# Patient Record
Sex: FEMALE | Race: OTHER | HISPANIC OR LATINO | Employment: PART TIME | URBAN - METROPOLITAN AREA
[De-identification: names, ages, dates, MRNs, and addresses within clinical notes are randomized per-mention and may not be internally consistent; named-entity substitution may affect disease eponyms.]

---

## 2023-04-18 ENCOUNTER — HOSPITAL ENCOUNTER (EMERGENCY)
Facility: HOSPITAL | Age: 33
Discharge: HOME | End: 2023-04-18
Attending: EMERGENCY MEDICINE

## 2023-04-18 ENCOUNTER — TELEPHONE (OUTPATIENT)
Dept: OBGYN CLINIC | Facility: OTHER | Age: 33
End: 2023-04-18

## 2023-04-18 ENCOUNTER — APPOINTMENT (EMERGENCY)
Dept: RADIOLOGY | Facility: HOSPITAL | Age: 33
End: 2023-04-18

## 2023-04-18 VITALS
RESPIRATION RATE: 18 BRPM | OXYGEN SATURATION: 100 % | WEIGHT: 133.6 LBS | SYSTOLIC BLOOD PRESSURE: 116 MMHG | HEART RATE: 79 BPM | TEMPERATURE: 98.2 F | DIASTOLIC BLOOD PRESSURE: 59 MMHG

## 2023-04-18 DIAGNOSIS — M25.532 LEFT WRIST PAIN: Primary | ICD-10-CM

## 2023-04-18 DIAGNOSIS — M67.432 GANGLION CYST OF DORSUM OF LEFT WRIST: ICD-10-CM

## 2023-04-18 LAB
EXT PREGNANCY TEST URINE: NEGATIVE
EXT. CONTROL: NORMAL

## 2023-04-18 RX ORDER — KETOROLAC TROMETHAMINE 30 MG/ML
15 INJECTION, SOLUTION INTRAMUSCULAR; INTRAVENOUS ONCE
Status: COMPLETED | OUTPATIENT
Start: 2023-04-18 | End: 2023-04-18

## 2023-04-18 RX ADMIN — KETOROLAC TROMETHAMINE 15 MG: 30 INJECTION, SOLUTION INTRAMUSCULAR; INTRAVENOUS at 11:27

## 2023-04-18 NOTE — TELEPHONE ENCOUNTER
Patient is being referred to a orthopedics  Please schedule accordingly      [address]   [phone]

## 2023-04-18 NOTE — Clinical Note
Malia Sangeetha was seen and treated in our emergency department on 4/18/2023  Diagnosis:     Arpitavidal Garcia    She may return on this date: 04/19/2023         If you have any questions or concerns, please don't hesitate to call        Tiffani Lockett PA-C    ______________________________           _______________          _______________  Hospital Representative                              Date                                Time

## 2023-04-18 NOTE — ED PROVIDER NOTES
History  Chief Complaint   Patient presents with   • Wrist Pain     Pt c/o left wrist pain for the past x 2-3 weeks  Pt denies any known injury  Pt denies any other medical concerns     Andrew Littlejohn is a 35 y o  female with no significant past medical history presenting to the ER complaining of worsening left wrist pain over the past 3 months  Patient reports that 3 weeks ago she noticed a bump at the dorsal aspect of her wrist   Patient reports that pain is worse with movement and especially when she is cutting vegetables     She denies any injury  Patient has not attempted any treatment at home  None       History reviewed  No pertinent past medical history  Past Surgical History:   Procedure Laterality Date   •  SECTION         History reviewed  No pertinent family history  I have reviewed and agree with the history as documented  E-Cigarette/Vaping   • E-Cigarette Use Never User      E-Cigarette/Vaping Substances     Social History     Tobacco Use   • Smoking status: Never   • Smokeless tobacco: Never   Vaping Use   • Vaping Use: Never used   Substance Use Topics   • Alcohol use: Never   • Drug use: Never       Review of Systems   Constitutional: Negative for chills and fever  HENT: Negative for ear pain and sore throat  Eyes: Negative for pain and visual disturbance  Respiratory: Negative for cough and shortness of breath  Cardiovascular: Negative for chest pain and palpitations  Gastrointestinal: Negative for abdominal pain and vomiting  Genitourinary: Negative for dysuria and hematuria  Musculoskeletal: Negative for arthralgias and back pain  Left wrist pain   Skin: Negative for color change and rash  Neurological: Negative for seizures and syncope  All other systems reviewed and are negative  Physical Exam  Physical Exam  Vitals and nursing note reviewed  Constitutional:       General: She is not in acute distress       Appearance: She is well-developed  She is not ill-appearing, toxic-appearing or diaphoretic  HENT:      Head: Normocephalic and atraumatic  Eyes:      Conjunctiva/sclera: Conjunctivae normal    Cardiovascular:      Rate and Rhythm: Normal rate and regular rhythm  Heart sounds: No murmur heard  Pulmonary:      Effort: Pulmonary effort is normal  No respiratory distress  Breath sounds: Normal breath sounds  No stridor  No wheezing, rhonchi or rales  Abdominal:      Palpations: Abdomen is soft  Tenderness: There is no abdominal tenderness  Musculoskeletal:         General: No swelling  Right wrist: Normal  No swelling, bony tenderness or snuff box tenderness  Normal range of motion  Normal pulse  Left wrist: No swelling, bony tenderness or snuff box tenderness  Normal range of motion  Normal pulse  Cervical back: Neck supple  Comments: Ganglion cyst at the dorsal aspect of left wrist  Image attached to chart  There is tenderness to palpation over ganglion cyst   No other bony tenderness at left wrist or overlying skin changes  Full range of motion  Reported pain when ranging L wrist     Skin:     General: Skin is warm and dry  Capillary Refill: Capillary refill takes less than 2 seconds  Neurological:      Mental Status: She is alert     Psychiatric:         Mood and Affect: Mood normal              Vital Signs  ED Triage Vitals [04/18/23 1019]   Temperature Pulse Respirations Blood Pressure SpO2   98 2 °F (36 8 °C) 79 18 116/59 100 %      Temp Source Heart Rate Source Patient Position - Orthostatic VS BP Location FiO2 (%)   Oral Monitor Sitting Right arm --      Pain Score       8           Vitals:    04/18/23 1019   BP: 116/59   Pulse: 79   Patient Position - Orthostatic VS: Sitting         Visual Acuity      ED Medications  Medications   ketorolac (TORADOL) injection 15 mg (15 mg Intramuscular Given 4/18/23 1127)       Diagnostic Studies  Results Reviewed     Procedure Component Value Units Date/Time    POCT pregnancy, urine [[id number]]  (Normal) Resulted: 04/18/23 1126    Lab Status: Final result Updated: 04/18/23 1126     EXT Preg Test, Ur Negative     Control Valid                 XR wrist 3+ views LEFT   ED Interpretation by Lobito Kuo PA-C (04/18 1233)   No obvious acute osseous abnormality      Final Result by Daniella Mcmanus MD (04/18 1435)      No acute osseous abnormality  Workstation performed: [id number]                    Procedures  Procedures         ED Course                               SBIRT 20yo+    Flowsheet Row Most Recent Value   Initial Alcohol Screen: US AUDIT-C     1  How often do you have a drink containing alcohol? 0 Filed at: 04/18/2023 1033   2  How many drinks containing alcohol do you have on a typical day you are drinking? 0 Filed at: 04/18/2023 1033   3a  Male UNDER 65: How often do you have five or more drinks on one occasion? 0 Filed at: 04/18/2023 1033   3b  FEMALE Any Age, or MALE 65+: How often do you have 4 or more drinks on one occassion? 0 Filed at: 04/18/2023 1033   Audit-C Score 0 Filed at: 04/18/2023 1033   GARRETT: How many times in the past year have you    Used an illegal drug or used a prescription medication for non-medical reasons? Never Filed at: 04/18/2023 1033                    Medical Decision Making  Tiff Hawkins is a 35 y o  female with no significant past medical history presenting to the ER complaining of worsening left wrist pain over the past 3 months  Denies any injury  On exam patient is well-appearing and in no acute distress  Vital signs are within normal limits  Physical examination reveals ganglion cyst at the dorsal aspect of left wrist  Image attached to chart  There is tenderness to palpation over ganglion cyst   No other bony tenderness at left wrist or overlying skin changes  Full range of motion    Reported pain when ranging L wrist   Suspect that pain may be due to pressure from the ganglion cyst   Will order x-ray of wrist and Toradol for pain  Patient is agreeable with plan  X-ray wrist reveals no obvious acute osseous abnormalities when read by me  Discussed imaging results with patient  She reports that pain has significantly improved after Toradol  Provided Ace bandage for comfort  Extensively discussed appropriate supportive care at home  Placed ambulatory referral to hand surgery and advised close follow-up with hand surgery  Advise close follow-up with PCP  Advised strict return precautions to the ER  Patient is understanding and agreeable with plan  Ganglion cyst of dorsum of left wrist: acute illness or injury  Left wrist pain: acute illness or injury  Amount and/or Complexity of Data Reviewed  Labs: ordered  Radiology: ordered and independent interpretation performed  Risk  Prescription drug management  Disposition  Final diagnoses:   Left wrist pain   Ganglion cyst of dorsum of left wrist     Time reflects when diagnosis was documented in both MDM as applicable and the Disposition within this note     Time User Action Codes Description Comment    4/18/2023 12:34 PM Vernard Fail Add [M25 532] Left wrist pain     4/18/2023 12:34 PM Vernard Fail Add [N51 755] Ganglion cyst of dorsum of left wrist       ED Disposition     ED Disposition   Discharge    Condition   Stable    Date/Time   Tue Apr 18, 2023 12:34 PM    Comment   Zuhair Coca discharge to home/self care                 Follow-up Information     Follow up With Specialties Details Why Contact Info Additional Όθωνος MD [facility], Hand Surgery   [address]  [phone]       [address] [place] [address] Emergency Department Emergency Medicine  If symptoms worsen [facility] [id number]  [address] [place] [address] Emergency Department, [address] [place] [address] Ave  , [place], [place], [address] There are no discharge medications for this patient            PDMP Review     None          ED Provider  Electronically Signed by           Curtis Hester PA-C  04/18/23 7876

## 2023-05-16 ENCOUNTER — OFFICE VISIT (OUTPATIENT)
Dept: OBGYN CLINIC | Facility: CLINIC | Age: 33
End: 2023-05-16

## 2023-05-16 VITALS — SYSTOLIC BLOOD PRESSURE: 117 MMHG | DIASTOLIC BLOOD PRESSURE: 74 MMHG | WEIGHT: 133 LBS | HEART RATE: 69 BPM

## 2023-05-16 DIAGNOSIS — M67.432 GANGLION CYST OF DORSUM OF LEFT WRIST: ICD-10-CM

## 2023-05-16 NOTE — PROGRESS NOTES
Assessment:    Left dorsal wrist ganglion cyst    Plan:    Cyst ruptured during aspiration attempt today  Tolerated well  Activities as tolerated, no restrictions  Ace wrap provided for compression over the next day or so  If cyst returns, patient can call the office and we can schedule surgical excision if this is something she would like to pursue  Patient would not need another office visit for this  Visit Diagnoses     Ganglion cyst of dorsum of left wrist                       Subjective:     HPI    Patient ID:  Indira Farias is a right-hand-dominant 35 y o  female resenting for evaluation of the left wrist   According to the patient, she noticed a small lump on the dorsal aspect of her left wrist about 2 months ago that came on without any trauma or injury to the area  She states that has waxed and waned in size and when it gets larger, it is uncomfortable especially with wrist motion  He presents to the ER for this where she had x-rays were negative  She was referred here  Today, she states the mass is present but not bothersome  No numbness and tingling into the fingers  No history of surgery to the left wrist       The following portions of the patient's history were reviewed and updated as appropriate: allergies, current medications, past family history, past medical history, past social history, past surgical history, and problem list     Review of Systems     Objective:    Imaging:  Left wrist x-rays 4/18/2023    VIEWS:  XR WRIST 3+ VW LEFT         FINDINGS:     There is no acute fracture or dislocation      No significant degenerative changes      No lytic or blastic osseous lesion      Soft tissues are unremarkable      IMPRESSION:     No acute osseous abnormality      Physical Exam     Vitals:    05/16/23 1001   BP: 117/74   Pulse: 69       General appearance:  NAD   Cardiac:  Regular rate  Lungs:  Unlabored breathing  Abdomen:  Non-distended    Orthopedic Examination:  Left wrist     Inspection: No open wound or erythema  No ecchymosis or swelling  Is a small dime sized dorsal mass that is more prominent with wrist flexion  Palpation: Mass is minimally tender to palpation, no abnormal warmth of the skin  Range-of-motion: Full wrist range of motion, mass is prominent with wrist flexion  Full composite fist     Strength: 5/5 wrist flexion extension,     Sensation: Intact median radial ulnar nerve distribution    Special Tests: Palpable radial pulse    Small joint arthrocentesis  Universal Protocol:  Consent: Verbal consent obtained  Risks and benefits: risks, benefits and alternatives were discussed  Consent given by: patient  Patient identity confirmed: verbally with patient    Procedure Details  Preparation: Patient was prepped and draped in the usual sterile fashion  Needle gauge: 18g  Approach: dorsal    Patient tolerance: patient tolerated the procedure well with no immediate complications  Dressing:  Sterile dressing applied    Left dorsal wrist ganglion cyst aspiration  Cyst ruptured with needle insertion  Patient tolerated well

## 2023-06-22 ENCOUNTER — APPOINTMENT (EMERGENCY)
Dept: RADIOLOGY | Facility: HOSPITAL | Age: 33
End: 2023-06-22
Payer: COMMERCIAL

## 2023-06-22 ENCOUNTER — HOSPITAL ENCOUNTER (EMERGENCY)
Facility: HOSPITAL | Age: 33
Discharge: HOME | End: 2023-06-22
Attending: EMERGENCY MEDICINE
Payer: COMMERCIAL

## 2023-06-22 VITALS
OXYGEN SATURATION: 99 % | RESPIRATION RATE: 18 BRPM | SYSTOLIC BLOOD PRESSURE: 108 MMHG | HEART RATE: 85 BPM | WEIGHT: 130.07 LBS | DIASTOLIC BLOOD PRESSURE: 64 MMHG | TEMPERATURE: 97.9 F

## 2023-06-22 DIAGNOSIS — J40 BRONCHITIS: Primary | ICD-10-CM

## 2023-06-22 LAB
FLUAV RNA RESP QL NAA+PROBE: NEGATIVE
FLUBV RNA RESP QL NAA+PROBE: NEGATIVE
RSV RNA RESP QL NAA+PROBE: NEGATIVE
S PYO DNA THROAT QL NAA+PROBE: NOT DETECTED
SARS-COV-2 RNA RESP QL NAA+PROBE: NEGATIVE

## 2023-06-22 PROCEDURE — 71046 X-RAY EXAM CHEST 2 VIEWS: CPT

## 2023-06-22 PROCEDURE — 99284 EMERGENCY DEPT VISIT MOD MDM: CPT

## 2023-06-22 PROCEDURE — 0241U HB NFCT DS VIR RESP RNA 4 TRGT: CPT

## 2023-06-22 PROCEDURE — 87651 STREP A DNA AMP PROBE: CPT

## 2023-06-22 RX ORDER — IBUPROFEN 600 MG/1
600 TABLET ORAL ONCE
Status: COMPLETED | OUTPATIENT
Start: 2023-06-22 | End: 2023-06-22

## 2023-06-22 RX ORDER — BENZONATATE 100 MG/1
100 CAPSULE ORAL EVERY 8 HOURS
Qty: 21 CAPSULE | Refills: 0 | Status: SHIPPED | OUTPATIENT
Start: 2023-06-22

## 2023-06-22 RX ORDER — AZITHROMYCIN 250 MG/1
TABLET, FILM COATED ORAL
Qty: 6 TABLET | Refills: 0 | Status: SHIPPED | OUTPATIENT
Start: 2023-06-22 | End: 2023-06-26

## 2023-06-22 RX ADMIN — IBUPROFEN 600 MG: 600 TABLET, FILM COATED ORAL at 12:31

## 2023-06-22 NOTE — Clinical Note
Susana Corbin was seen and treated in our emergency department on 6/22/2023  Diagnosis:     Kai Kuo  may return to work on return date  She may return on this date: 06/23/2023         If you have any questions or concerns, please don't hesitate to call        Jordy Chambers PA-C    ______________________________           _______________          _______________  Hospital Representative                              Date                                Time

## 2023-06-22 NOTE — ED PROVIDER NOTES
History  Chief Complaint   Patient presents with   • Cold Like Symptoms     Cough, congestion and headache beginning yesterday  Patient is a 24-year-old female without significant past medical history presenting for evaluation of cough  Reports productive cough with green/yellow sputum for 1 month with associated nasal congestion, sore throat  Reports occasional bilateral back pain while coughing  No chest pain or shortness of breath  She reports a headache last night that resolved with Tylenol  Denies any visual changes, tinnitus, lightheadedness  Denies fever, nausea, vomiting, abdominal pain, diarrhea  She took TheraFlu and Delsym at home without relief  No known sick contacts  None       History reviewed  No pertinent past medical history  Past Surgical History:   Procedure Laterality Date   •  SECTION         History reviewed  No pertinent family history  I have reviewed and agree with the history as documented  E-Cigarette/Vaping   • E-Cigarette Use Never User      E-Cigarette/Vaping Substances     Social History     Tobacco Use   • Smoking status: Never   • Smokeless tobacco: Never   Vaping Use   • Vaping Use: Never used   Substance Use Topics   • Alcohol use: Never   • Drug use: Never       Review of Systems   Constitutional: Negative for chills and fever  HENT: Positive for congestion and sore throat  Negative for ear pain  Eyes: Negative for pain and visual disturbance  Respiratory: Positive for cough  Negative for shortness of breath  Cardiovascular: Negative for chest pain and palpitations  Gastrointestinal: Negative for abdominal pain, diarrhea, nausea and vomiting  Genitourinary: Negative for dysuria, frequency and hematuria  Musculoskeletal: Negative for arthralgias and back pain  Skin: Negative for color change and rash  Neurological: Positive for headaches   Negative for dizziness, seizures, syncope, facial asymmetry, speech difficulty, weakness, light-headedness and numbness  All other systems reviewed and are negative  Physical Exam  Physical Exam  Vitals and nursing note reviewed  Constitutional:       General: She is not in acute distress  Appearance: Normal appearance  She is not ill-appearing or toxic-appearing  HENT:      Head: Normocephalic and atraumatic  Right Ear: Tympanic membrane, ear canal and external ear normal       Left Ear: Tympanic membrane, ear canal and external ear normal       Nose: Nose normal  No congestion  Mouth/Throat:      Mouth: Mucous membranes are moist       Pharynx: Posterior oropharyngeal erythema present  No oropharyngeal exudate  Eyes:      General: No scleral icterus  Right eye: No discharge  Left eye: No discharge  Extraocular Movements: Extraocular movements intact  Conjunctiva/sclera: Conjunctivae normal    Cardiovascular:      Rate and Rhythm: Normal rate and regular rhythm  Pulses: Normal pulses  Heart sounds: Normal heart sounds  Pulmonary:      Effort: Pulmonary effort is normal  No respiratory distress  Breath sounds: Normal breath sounds  Abdominal:      Palpations: Abdomen is soft  Tenderness: There is no abdominal tenderness  Musculoskeletal:         General: No tenderness, deformity or signs of injury  Cervical back: Normal range of motion and neck supple  Skin:     General: Skin is dry  Coloration: Skin is not jaundiced  Findings: No erythema or rash  Neurological:      General: No focal deficit present  Mental Status: She is alert and oriented to person, place, and time  Mental status is at baseline  Motor: No weakness  Gait: Gait normal    Psychiatric:         Mood and Affect: Mood normal          Behavior: Behavior normal          Thought Content:  Thought content normal          Vital Signs  ED Triage Vitals   Temperature Pulse Respirations Blood Pressure SpO2   06/22/23 1144 06/22/23 1144 06/22/23 1144 06/22/23 1144 06/22/23 1144   97 9 °F (36 6 °C) 85 18 108/64 99 %      Temp Source Heart Rate Source Patient Position - Orthostatic VS BP Location FiO2 (%)   06/22/23 1144 06/22/23 1144 06/22/23 1144 06/22/23 1144 --   Oral Monitor Sitting Right arm       Pain Score       06/22/23 1231       4           Vitals:    06/22/23 1144   BP: 108/64   Pulse: 85   Patient Position - Orthostatic VS: Sitting         Visual Acuity      ED Medications  Medications   ibuprofen (MOTRIN) tablet 600 mg (600 mg Oral Given 6/22/23 1231)       Diagnostic Studies  Results Reviewed     Procedure Component Value Units Date/Time    Strep A PCR [[id number]]  (Normal) Collected: 06/22/23 1231    Lab Status: Final result Specimen: Throat Updated: 06/22/23 1334     STREP A PCR Not Detected    FLU/RSV/COVID - if FLU/RSV clinically relevant [[id number]] Collected: 06/22/23 1231    Lab Status: In process Specimen: Nares from Nose Updated: 06/22/23 1240                 XR chest 2 views   ED Interpretation by Toi Lancaster PA-C (06/22 1246)   No acute pathology as interpreted by myself  Final Result by Silke Smith MD (06/22 1501)      No acute cardiopulmonary disease  In the setting of clinically suspected/proven COVID-19, this plain film appearance does not contain findings that raise concern for viral pneumonia such as COVID-19, but does not rule out this diagnosis  Workstation performed: [id number]                    Procedures  Procedures         ED Course                                             Medical Decision Making  Patient is a 66-year-old female without significant past medical history presenting for evaluation of cough, sore throat, nasal congestion for 1 month  No fevers or other symptoms  All vitals are within normal limits on arrival to the ED  Physical exam is remarkable for mild throat erythema without tonsillar swelling or exudates      DDx including but limited to viral pharyngitis, strep pharyngitis, viral URI, pneumonia, bronchitis  Will order COVID/flu/RSV and strep PCR  We will also check chest x-ray to rule out pneumonia  Chest x-ray negative for pneumonia as interpreted by myself  We will call with positive COVID/flu/RSV or strep results and start antibiotics for strep at that time if indicated  Given duration of symptoms and sputum characteristics, will treat for bronchitis with a Z-Shane  Also prescribed Tessalon Perles  Instructed take ibuprofen and Tylenol as needed for pain and fever  Instructed follow-up with PCP  I have discussed findings and plan for discharge with the patient/caregiver  Follow up with the appropriate providers including primary care physician was discussed  Return precautions discussed with patient/caregiver as outlined in AVS  Patient/caregiver verbally expressed understanding  Patient stable at time of discharge and ambulated out of the emergency department  Bronchitis: acute illness or injury  Amount and/or Complexity of Data Reviewed  Labs: ordered  Radiology: ordered and independent interpretation performed  Risk  Prescription drug management  Disposition  Final diagnoses:   Bronchitis     Time reflects when diagnosis was documented in both MDM as applicable and the Disposition within this note     Time User Action Codes Description Comment    6/22/2023 12:51 PM Rinku Sheikh [address] Bronchitis       ED Disposition     ED Disposition   Discharge    Condition   Stable    Date/Time   Thu Jun 22, 2023 12:51 PM    Comment   Santhosh Snyder discharge to home/self care                 Follow-up Information     Follow up With Specialties Details Why [address] [facility] 2nd Floor Family Medicine Schedule an appointment as soon as possible for a visit   [address] [place] [address]  [phone]            Discharge Medication List as of 6/22/2023 12:55 PM      START taking these medications    Details   azithromycin (ZITHROMAX) 250 mg tablet Take 2 tablets today then 1 tablet daily x 4 days, Normal      benzonatate (TESSALON PERLES) 100 mg capsule Take 1 capsule (100 mg total) by mouth every 8 (eight) hours, Starting Thu 6/22/2023, Normal             No discharge procedures on file      PDMP Review     None          ED Provider  Electronically Signed by           Gabriela Falcon PA-C  06/22/23 8801

## 2023-06-22 NOTE — DISCHARGE INSTRUCTIONS
[place] antibióticos y supresores de la tos según lo recetado  Llamará con resultados positivos de COVID / gripe o estreptococos  [place] un seguimiento con un médico de atención primaria si los síntomas persisten  Take antibiotics and cough suppressant as prescribed  Will call with positive COVID/flu or strep results   Follow up with primary care doctor if symptoms persist